# Patient Record
Sex: MALE | Race: ASIAN | NOT HISPANIC OR LATINO | URBAN - METROPOLITAN AREA
[De-identification: names, ages, dates, MRNs, and addresses within clinical notes are randomized per-mention and may not be internally consistent; named-entity substitution may affect disease eponyms.]

---

## 2024-07-13 ENCOUNTER — EMERGENCY (EMERGENCY)
Facility: HOSPITAL | Age: 19
LOS: 1 days | Discharge: HOME | End: 2024-07-13
Attending: EMERGENCY MEDICINE
Payer: COMMERCIAL

## 2024-07-13 VITALS
DIASTOLIC BLOOD PRESSURE: 72 MMHG | RESPIRATION RATE: 17 BRPM | OXYGEN SATURATION: 98 % | HEART RATE: 94 BPM | SYSTOLIC BLOOD PRESSURE: 147 MMHG | TEMPERATURE: 100 F

## 2024-07-13 PROCEDURE — 73610 X-RAY EXAM OF ANKLE: CPT | Mod: 26,RT

## 2024-07-13 PROCEDURE — 99284 EMERGENCY DEPT VISIT MOD MDM: CPT

## 2024-07-14 PROCEDURE — 99283 EMERGENCY DEPT VISIT LOW MDM: CPT | Mod: 25

## 2024-07-14 PROCEDURE — 73610 X-RAY EXAM OF ANKLE: CPT

## 2024-07-14 RX ADMIN — Medication 400 MILLIGRAM(S): at 00:20

## 2024-07-19 PROBLEM — Z00.00 ENCOUNTER FOR PREVENTIVE HEALTH EXAMINATION: Status: ACTIVE | Noted: 2024-07-19

## 2024-07-23 ENCOUNTER — NON-APPOINTMENT (OUTPATIENT)
Age: 19
End: 2024-07-23

## 2024-07-24 ENCOUNTER — APPOINTMENT (OUTPATIENT)
Age: 19
End: 2024-07-24
Payer: COMMERCIAL

## 2024-07-24 VITALS
SYSTOLIC BLOOD PRESSURE: 144 MMHG | HEIGHT: 69 IN | OXYGEN SATURATION: 98 % | DIASTOLIC BLOOD PRESSURE: 82 MMHG | HEART RATE: 74 BPM | WEIGHT: 190 LBS | BODY MASS INDEX: 28.14 KG/M2

## 2024-07-24 DIAGNOSIS — S93.491A SPRAIN OF OTHER LIGAMENT OF RIGHT ANKLE, INITIAL ENCOUNTER: ICD-10-CM

## 2024-07-24 DIAGNOSIS — S93.421A SPRAIN OF DELTOID LIGAMENT OF RIGHT ANKLE, INITIAL ENCOUNTER: ICD-10-CM

## 2024-07-24 PROCEDURE — 99203 OFFICE O/P NEW LOW 30 MIN: CPT

## 2024-07-24 NOTE — DISCUSSION/SUMMARY
[de-identified] : Acute (R) ankle pain consistent with lateral ankle sprain and compensatory deltoid ligament pain/sprain.  Plan:  1. rehab exercises hand out given  2. referral to physical therapy  3. 6 week follow up 4. if no improvement, MRI referral   5. discontinue air cast

## 2024-07-24 NOTE — DISCUSSION/SUMMARY
[de-identified] : Acute (R) ankle pain consistent with lateral ankle sprain and compensatory deltoid ligament pain/sprain.  Plan:  1. rehab exercises hand out given  2. referral to physical therapy  3. 6 week follow up 4. if no improvement, MRI referral   5. discontinue air cast

## 2024-07-24 NOTE — PHYSICAL EXAM
[de-identified] : Foot/ankle (R)  Inspection  Skin: normal  Swelling: moderate  Arch: normal  Tendon defect: woody   Palpation  Tenderness: [positive] Location: [Medial aspect of (R) ankle]  Ankle ROM  Dorsiflexion: limited Plantarflexion: normal  Eversion: limited Inversion: limited  Toe flexion: normal Toe extension: normal   Foot motor strength  Plantarflexion: 5/5 Dorsiflexion: 5/5 Inversion: 5/5 Eversion: 5/5   Sensory Index Normal   Distal Pulse Normal   Stress Test Positive Anterior Drawer   Special tests Single Leg Hop: N/A Morales: negative  Kleigers: negative  Tinnels: normal  [de-identified] : XR of right ankle Date: 7/13/2024   Views: 3 views Performed at [facility]: Yes Impression: No fracture no dislocation good alignment.  Mild to moderate lateral ankle soft tissue swelling.  These images were personally reviewed with original findings documented as above.

## 2024-07-24 NOTE — HISTORY OF PRESENT ILLNESS
[de-identified] : Jennie Toro is a 18 year old male presenting with (R) ankle pain. Patient states twisting their ankle while playing basketball on July 13, 2024. Patient went to the ER the same day, X Rays were done and patient was given an air-cast. Patient has been partial weight baring since the injury with the use of crutches.

## 2024-07-24 NOTE — HISTORY OF PRESENT ILLNESS
[de-identified] : Jennie Toro is a 18 year old male presenting with (R) ankle pain. Patient states twisting their ankle while playing basketball on July 13, 2024. Patient went to the ER the same day, X Rays were done and patient was given an air-cast. Patient has been partial weight baring since the injury with the use of crutches.

## 2024-07-24 NOTE — PHYSICAL EXAM
[de-identified] : Foot/ankle (R)  Inspection  Skin: normal  Swelling: moderate  Arch: normal  Tendon defect: woody   Palpation  Tenderness: [positive] Location: [Medial aspect of (R) ankle]  Ankle ROM  Dorsiflexion: limited Plantarflexion: normal  Eversion: limited Inversion: limited  Toe flexion: normal Toe extension: normal   Foot motor strength  Plantarflexion: 5/5 Dorsiflexion: 5/5 Inversion: 5/5 Eversion: 5/5   Sensory Index Normal   Distal Pulse Normal   Stress Test Positive Anterior Drawer   Special tests Single Leg Hop: N/A Morales: negative  Kleigers: negative  Tinnels: normal  [de-identified] : XR of right ankle Date: 7/13/2024   Views: 3 views Performed at [facility]: Yes Impression: No fracture no dislocation good alignment.  Mild to moderate lateral ankle soft tissue swelling.  These images were personally reviewed with original findings documented as above.

## 2024-09-06 ENCOUNTER — APPOINTMENT (OUTPATIENT)
Age: 19
End: 2024-09-06